# Patient Record
Sex: FEMALE | Race: WHITE | NOT HISPANIC OR LATINO | Employment: FULL TIME | URBAN - METROPOLITAN AREA
[De-identification: names, ages, dates, MRNs, and addresses within clinical notes are randomized per-mention and may not be internally consistent; named-entity substitution may affect disease eponyms.]

---

## 2024-07-18 ENCOUNTER — HOSPITAL ENCOUNTER (OUTPATIENT)
Dept: CARDIOLOGY | Facility: HOSPITAL | Age: 64
Discharge: HOME | End: 2024-07-18
Payer: COMMERCIAL

## 2024-07-18 DIAGNOSIS — R60.0 LOCALIZED EDEMA: ICD-10-CM

## 2024-07-18 DIAGNOSIS — R22.1 LOCALIZED SWELLING, MASS AND LUMP, NECK: ICD-10-CM

## 2024-07-18 PROCEDURE — 93971 EXTREMITY STUDY: CPT

## 2024-07-18 PROCEDURE — 93971 EXTREMITY STUDY: CPT | Performed by: SURGERY

## 2024-08-16 ENCOUNTER — APPOINTMENT (OUTPATIENT)
Dept: RADIOLOGY | Facility: CLINIC | Age: 64
End: 2024-08-16
Payer: COMMERCIAL

## 2024-10-10 ENCOUNTER — APPOINTMENT (OUTPATIENT)
Dept: RADIOLOGY | Facility: CLINIC | Age: 64
End: 2024-10-10
Payer: COMMERCIAL

## 2024-10-10 ENCOUNTER — HOSPITAL ENCOUNTER (OUTPATIENT)
Dept: RADIOLOGY | Facility: CLINIC | Age: 64
Discharge: HOME | End: 2024-10-10
Payer: COMMERCIAL

## 2024-10-10 DIAGNOSIS — R91.1 SOLITARY PULMONARY NODULE: ICD-10-CM

## 2024-10-10 PROCEDURE — 71250 CT THORAX DX C-: CPT

## 2025-07-29 ENCOUNTER — OFFICE VISIT (OUTPATIENT)
Facility: CLINIC | Age: 65
End: 2025-07-29
Payer: COMMERCIAL

## 2025-07-29 DIAGNOSIS — R06.1 STRIDOR: ICD-10-CM

## 2025-07-29 DIAGNOSIS — J39.8 TRACHEAL STENOSIS: ICD-10-CM

## 2025-07-29 DIAGNOSIS — K21.9 LARYNGOPHARYNGEAL REFLUX (LPR): Primary | ICD-10-CM

## 2025-07-29 PROCEDURE — 99204 OFFICE O/P NEW MOD 45 MIN: CPT | Performed by: STUDENT IN AN ORGANIZED HEALTH CARE EDUCATION/TRAINING PROGRAM

## 2025-07-29 PROCEDURE — 31575 DIAGNOSTIC LARYNGOSCOPY: CPT | Performed by: STUDENT IN AN ORGANIZED HEALTH CARE EDUCATION/TRAINING PROGRAM

## 2025-07-29 RX ORDER — FAMOTIDINE 20 MG/1
20 TABLET, FILM COATED ORAL 2 TIMES DAILY
Qty: 60 TABLET | Refills: 11 | Status: SHIPPED | OUTPATIENT
Start: 2025-07-29 | End: 2026-07-29

## 2025-07-29 NOTE — PROGRESS NOTES
ENT Outpatient Consultation    Chief Complaint: Neck discomfort, dyspnea  History Of Present Illness  Joana Mena is a 64 y.o. female presents for evaluation of subjective neck swelling, dyspnea, blood in sputum.  She presented today with her sister who had a scheduled appointment and was added on today.  For the last few months, she reports some subjective swelling along the right neck and occasional right ear discomfort.  She was placed on steroids by her primary doctor with possible slight improvement in her symptoms.  She has a chronic cough and at least over the last 6 months she has noted occasional blood tinge in her sputum.  No known autoimmune diseases.  She is a current non-smoker.  She quit smoking in the 90s after smoking 1 pack/day.  She currently works as a .  No dysphagia, odynophagia, or chronic voice issues.  She does talk a lot at work and feels like she has some mild hoarseness towards the end of the day.  She walks on daily basis and has noted that she has some more dyspnea with exertion.  According to the patient, her lungs have always been clear to auscultation in a prior CT chest was unremarkable.    She has a history of epiglottitis and was in the ICU in 2020.  She does not remember being intubated at that time.  No known autoimmune diseases.     Past Medical History  She has no past medical history on file.    Surgical History  She has no past surgical history on file.     Social History  She has no history on file for tobacco use, alcohol use, and drug use.    Family History  Family History[1]     Allergies  Codeine and Penicillins     Physical Exam:  CONSTITUTIONAL:  No acute distress  VOICE:  No hoarseness or other abnormality  RESPIRATION:  Breathing comfortably, quiet stridor on deep inhalation  EYES:  EOM intact, sclera normal  NEURO:  Alert and oriented times 3  HEAD AND FACE:  Symmetric facial features, no masses or lesions, sinuses non-tender to  palpation  EARS:  Normal external ears, external auditory canals, and TMs to otoscopy  NOSE:  External nose midline, anterior rhinoscopy shows mild crusting on the left anterior septum, bilateral inferior turbinate hypertrophy  ORAL CAVITY/OROPHARYNX/LIPS:  Normal mucous membranes, normal floor of mouth/tongue/OP, no masses or lesions  PHARYNGEAL WALLS:  No masses or lesions  NECK/LYMPH: Tenderness to palpation in the right submandibular triangle and deep against the hyoid bone, no palpable lymphadenopathy  SKIN:  Neck skin is without scar or injury  PSYCH:  Alert and oriented with appropriate mood and affect    Procedure Note: Flexible Nasolaryngoscopy  Verbal informed consent was obtained from the patient/patient's guardian. 4% lidocaine mixed with phenylephrine was prepared and dripped into the nose. It was placed in the right and left naris. Following an appropriate amount of time to allow for adequate anesthesia, a flexible fiberoptic nasolaryngoscope was placed into the patient's right and left naris. The nasal cavity, nasopharynx, oropharynx, hypopharynx, and all endolaryngeal structures were visualized and were normal except as listed below. Significant findings included:  - Bilateral nasal cavities unremarkable with the exception of mild crusting on the left anterior septum  - Base of tongue without masses or lesions  - Posterior supraglottis is edematous and erythematous  - Normal vocal cord mobility, no exophytic lesions noted on the vocal cords  - Apparent subglottic/upper tracheal circumferential narrowing, although unable to visualize posterior tracheal wall         Last Recorded Vitals  There were no vitals taken for this visit.    Relevant Results    Prior CT chest personally reviewed    Assessment and Plan  64 y.o. female with cough, occasional blood-tinged sputum, and progressive dyspnea on exertion in the setting of mild inspiratory stridor at rest.  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  I counseled her that I would like her to start Pepcid 20 mg twice daily to treat LPR.  I have placed a referral to my colleagues in laryngology to better evaluate and treat the likely stenosis.  I encouraged the patient to seek care in the emergency department if her breathing worsens acutely in the setting of illness or otherwise.    Problem List Items Addressed This Visit    None  Visit Diagnoses         Laryngopharyngeal reflux (LPR)    -  Primary    Relevant Medications    famotidine (Pepcid) 20 mg tablet      Tracheal stenosis        Relevant Orders    Referral to ENT      [facility]                Lashell Abarca MD         [1] No family history on file.